# Patient Record
Sex: FEMALE | Race: BLACK OR AFRICAN AMERICAN | NOT HISPANIC OR LATINO | Employment: FULL TIME | URBAN - METROPOLITAN AREA
[De-identification: names, ages, dates, MRNs, and addresses within clinical notes are randomized per-mention and may not be internally consistent; named-entity substitution may affect disease eponyms.]

---

## 2018-05-01 PROBLEM — E66.811 OBESITY (BMI 30.0-34.9): Status: ACTIVE | Noted: 2018-05-01

## 2024-01-08 ENCOUNTER — PATIENT MESSAGE (OUTPATIENT)
Dept: ADMINISTRATIVE | Facility: HOSPITAL | Age: 59
End: 2024-01-08
Payer: COMMERCIAL

## 2024-01-08 ENCOUNTER — PATIENT OUTREACH (OUTPATIENT)
Dept: ADMINISTRATIVE | Facility: HOSPITAL | Age: 59
End: 2024-01-08
Payer: COMMERCIAL

## 2024-01-08 NOTE — PROGRESS NOTES
Population Health Chart Review & Patient Outreach Details     Dr. Dominguez no longer with [facility],need establish care with another provider. If want to follow Dr Dominguez can go to [facility] at [address] [place] [address] [id number], [place] [address] - phone: [phone].  Due for overdue HM (mammogram), need to get recent records if already done. If not done, orders/referrals need to be place and schedule. Please forward messages to me.   Left message for patient to return call.Sent [employer] message.   Further Action Needed If Patient Returns Outreach:            Updates Requested / Reviewed:     [x]  Care Everywhere    []     []  External Sources ([facility], [facility], [facility], etc.)    [] [facility]   [] [facility]   [] Other:    [x]  Care Team Updated   []  Removed  or Duplicate Orders   []  Immunization Reconciliation Completed / Queried    [] [place]   [] [place]   [] [place]   [] [place]      Health Maintenance Topics Addressed and Outreach Outcomes / Actions Taken:             Breast Cancer Screening []  Mammogram Order Placed    []  Mammogram Screening Scheduled    []  External Records Requested & Care Team Updated if Applicable    []  External Records Uploaded & Care Team Updated if Applicable    []  Pt Declined Scheduling Mammogram    []  Pt Will Schedule with External Provider / Order Routed & Care Team Updated if Applicable              Cervical Cancer Screening []  Pap Smear Scheduled in Primary Care or OBGYN    []  External Records Requested & Care Team Updated if Applicable       []  External Records Uploaded, Care Team Updated, & History Updated if Applicable    []  Patient Declined Scheduling Pap Smear    []  Patient Will Schedule with External Provider & Care Team Updated if Applicable                  Colorectal Cancer Screening []  Colonoscopy Case Request / Referral / Home Test Order Placed    []  External Records Requested & Care Team Updated if Applicable    []  External Records  Uploaded, Care Team Updated, & History Updated if Applicable    []  Patient Declined Completing Colon Cancer Screening    []  Patient Will Schedule with External Provider & Care Team Updated if Applicable    []  Fit Kit Mailed (add the SmartPhrase under additional notes)    []  Reminded Patient to Complete Home Test                Diabetic Eye Exam []  Eye Exam Screening Order Placed    []  Eye Camera Scheduled or Optometry/Ophthalmology Referral Placed    []  External Records Requested & Care Team Updated if Applicable    []  External Records Uploaded, Care Team Updated, & History Updated if Applicable    []  Patient Declined Scheduling Eye Exam    []  Patient Will Schedule with External Provider & Care Team Updated if Applicable             Blood Pressure Control []  Primary Care Follow Up Visit Scheduled     []  Remote Blood Pressure Reading Captured    []  Patient Declined Remote Reading or Scheduling Appt - Escalated to PCP    []  Patient Will Call Back or Send Portal Message with Reading                 HbA1c & Other Labs []  Overdue Lab(s) Ordered    []  Overdue Lab(s) Scheduled    []  External Records Uploaded & Care Team Updated if Applicable    []  Primary Care Follow Up Visit Scheduled     []  Reminded Patient to Complete A1c Home Test    []  Patient Declined Scheduling Labs or Will Call Back to Schedule    []  Patient Will Schedule with External Provider / Order Routed, & Care Team Updated if Applicable           Primary Care Appointment []  Primary Care Appt Scheduled    []  Patient Declined Scheduling or Will Call Back to Schedule    []  Pt Established with External Provider, Updated Care Team, & Informed Pt to Notify Payor if Applicable           Medication Adherence /    Statin Use []  Primary Care Appointment Scheduled    []  Patient Reminded to  Prescription    []  Patient Declined, Provider Notified if Needed    []  Sent Provider Message to Review to Evaluate Pt for Statin, Add Exclusion Dx  Codes, Document   Exclusion in Problem List, Change Statin Intensity Level to Moderate or High Intensity if Applicable                Osteoporosis Screening []  Dexa Order Placed    []  Dexa Appointment Scheduled    []  External Records Requested & Care Team Updated    []  External Records Uploaded, Care Team Updated, & History Updated if Applicable    []  Patient Declined Scheduling Dexa or Will Call Back to Schedule    []  Patient Will Schedule with External Provider / Order Routed & Care Team Updated if Applicable       Additional Notes:

## 2024-06-16 DIAGNOSIS — I10 ESSENTIAL HYPERTENSION: ICD-10-CM

## 2024-06-17 RX ORDER — HYDROCHLOROTHIAZIDE 25 MG/1
25 TABLET ORAL
Qty: 90 TABLET | Refills: 0 | Status: SHIPPED | OUTPATIENT
Start: 2024-06-17

## 2024-06-17 NOTE — TELEPHONE ENCOUNTER
Refill Routing Note   Medication(s) are not appropriate for processing by [facility] for the following reason(s):        Non-participating provider    [facility] action(s):  Route               Appointments  past 12m or future 3m with PCP    Date Provider   Last Visit   6/22/2023 Juana Dominguez MD   Next Visit   Visit date not found Juana Dominguez MD   ED visits in past 90 days: 0        Note composed:3:37 AM 06/17/2024

## 2024-06-26 DIAGNOSIS — I10 ESSENTIAL HYPERTENSION: ICD-10-CM

## 2024-06-26 DIAGNOSIS — R73.03 PREDIABETES: ICD-10-CM

## 2024-07-09 ENCOUNTER — PATIENT MESSAGE (OUTPATIENT)
Dept: ADMINISTRATIVE | Facility: HOSPITAL | Age: 59
End: 2024-07-09
Payer: COMMERCIAL

## 2024-07-28 ENCOUNTER — PATIENT MESSAGE (OUTPATIENT)
Dept: FAMILY MEDICINE | Facility: CLINIC | Age: 59
End: 2024-07-28
Payer: COMMERCIAL

## 2024-09-17 DIAGNOSIS — I10 ESSENTIAL HYPERTENSION: ICD-10-CM

## 2024-09-17 RX ORDER — HYDROCHLOROTHIAZIDE 25 MG/1
25 TABLET ORAL
Qty: 90 TABLET | Refills: 0 | Status: SHIPPED | OUTPATIENT
Start: 2024-09-17

## 2024-11-25 ENCOUNTER — OFFICE VISIT (OUTPATIENT)
Dept: FAMILY MEDICINE | Facility: CLINIC | Age: 59
End: 2024-11-25
Payer: COMMERCIAL

## 2024-11-25 VITALS
SYSTOLIC BLOOD PRESSURE: 108 MMHG | DIASTOLIC BLOOD PRESSURE: 76 MMHG | TEMPERATURE: 99 F | HEIGHT: 66 IN | OXYGEN SATURATION: 95 % | WEIGHT: 212.06 LBS | HEART RATE: 73 BPM | BODY MASS INDEX: 34.08 KG/M2

## 2024-11-25 DIAGNOSIS — J30.9 CHRONIC ALLERGIC RHINITIS: ICD-10-CM

## 2024-11-25 DIAGNOSIS — E66.811 CLASS 1 OBESITY: ICD-10-CM

## 2024-11-25 DIAGNOSIS — Z00.00 ROUTINE ADULT HEALTH MAINTENANCE: Primary | ICD-10-CM

## 2024-11-25 DIAGNOSIS — Z82.0 FAMILY HISTORY OF ALZHEIMER'S DISEASE: ICD-10-CM

## 2024-11-25 PROCEDURE — 99396 PREV VISIT EST AGE 40-64: CPT | Mod: S$GLB,,, | Performed by: INTERNAL MEDICINE

## 2024-11-25 PROCEDURE — 99214 OFFICE O/P EST MOD 30 MIN: CPT | Mod: 25,S$GLB,, | Performed by: INTERNAL MEDICINE

## 2024-11-25 PROCEDURE — 99999 PR PBB SHADOW E&M-EST. PATIENT-LVL IV: CPT | Mod: PBBFAC,,, | Performed by: INTERNAL MEDICINE

## 2024-11-25 PROCEDURE — 3044F HG A1C LEVEL LT 7.0%: CPT | Mod: CPTII,S$GLB,, | Performed by: INTERNAL MEDICINE

## 2024-11-25 PROCEDURE — 3078F DIAST BP <80 MM HG: CPT | Mod: CPTII,S$GLB,, | Performed by: INTERNAL MEDICINE

## 2024-11-25 PROCEDURE — 3074F SYST BP LT 130 MM HG: CPT | Mod: CPTII,S$GLB,, | Performed by: INTERNAL MEDICINE

## 2024-11-25 PROCEDURE — 3008F BODY MASS INDEX DOCD: CPT | Mod: CPTII,S$GLB,, | Performed by: INTERNAL MEDICINE

## 2024-11-25 RX ORDER — TIRZEPATIDE 2.5 MG/.5ML
2.5 INJECTION, SOLUTION SUBCUTANEOUS
Qty: 2 ML | Refills: 0 | Status: SHIPPED | OUTPATIENT
Start: 2024-11-25

## 2024-11-25 RX ORDER — AZELASTINE 1 MG/ML
1 SPRAY, METERED NASAL 2 TIMES DAILY
Qty: 30 ML | Refills: 2 | Status: SHIPPED | OUTPATIENT
Start: 2024-11-25 | End: 2025-11-25

## 2024-11-25 NOTE — PATIENT INSTRUCTIONS
"[facility]  Address: [address] [place] [address] [place] [address]  Hours: Open ? Closes 4?PM  Phone: [phone]      HOW TO USE NEILMED SINUS RINSE TO IRRIGATE/CLEAN YOUR SINUSES      Obtain a Neilmed Sinus Rinse kit. You can get the kit from your local pharmacy or Dr. TATTOFF's website. Dr. TATTOFF offers three types of kits:   The Sinus Rinse Starter Kit includes an 8-ounce (240ml) squeeze bottle and 5 packets of premixed rinse solution.   The Sinus Rinse Complete Kit includes an 8-ounce (240 ml) squeeze bottle and 50 packets of premixed rinse solution.   The Sinus Rinse Kids Starter Kit includes a 4-ounce (120ml) squeeze bottle and 30 packets of premixed rinse solution, specially formulated for children.  Wash your hands to avoid contaminating the product. The CDC recommends that you use warm water and soap. Scrub your hands for about 20 seconds, or about the amount of time it takes to sing the "Happy Birthday" song twice.  Warm up distilled or previously boiled water until it is slightly warm. You can warm water up on the stove or in the microwave in a clean safe container. You should warm the water for 5 seconds at a time if using a microwave. It should be at body-temperature, or "lukewarm."   Do not use water that is not micro-filtered, boiled, or distilled to rinse your sinuses. Tap water may contain microorganisms that could cause illness. Fill the bottle with the designated amount of water. The correct amount of water should be 8 oz. (240 ml). Your water line should be at the dotted fill line of the bottle. If you are using a Kids Sinus Rinse kit, you will use 4 oz. (120 ml) of water  Pour the contents into the bottle and tighten the cap. Make sure you screw the cap on tightly so it doesn't fall off in the next step  Place one finger over the tip and shake the bottle gently. This will allow the saline mixture to dissolve into the waterPut the nozzle tip snugly against one of your nostrils. Keep your mouth open, " because the mixture can drain from your mouth as well as the opposite nostril. This also reduces pressure on the earsSqueeze the bottle gently to force the liquid into your nasal passages. Squeeze until the solution begins to drain from the opposite nostrilSqueeze the bottle until 1/4-to-1/2 (60-to-120 ml) is used in one nostril. You can use up to half the solution per nostril, but you should always use at least one-quarter of the solution for each. Blow your nose without pinching it completely shut. Pinching your nose entirely shut would put too much pressure on your eardrums. Then, try sniffing in the remaining solution to help clear out the nasopharyngeal area (at the back of your nasal passages).   Tilt your head to the opposite side to expel any remaining solution from your sinuses or nasal passage.   Spit out any solution that reaches the back of your throat.  Repeat the last 5 steps for the other nostril  Discard the tiny amount of solution left over. Never store leftover solution. It can breed bacteria  Disinfect the sinus rinse bottle. Rinse the cap, tube, and rinse bottle with water. Then, add a drop of dishwashing detergent to the bottle and fill it with water. Put the cap on and shake the bottle well. Squeeze the soapy water through the cap. Use a bottle brush to scrub the bottle, cap, and tube. Rinse thoroughly with clean water. Air dry the bottle and nozzle on a clean towel or glass plate

## 2024-11-26 ENCOUNTER — LAB VISIT (OUTPATIENT)
Dept: LAB | Facility: HOSPITAL | Age: 59
End: 2024-11-26
Attending: INTERNAL MEDICINE
Payer: COMMERCIAL

## 2024-11-26 ENCOUNTER — PATIENT OUTREACH (OUTPATIENT)
Dept: ADMINISTRATIVE | Facility: HOSPITAL | Age: 59
End: 2024-11-26
Payer: COMMERCIAL

## 2024-11-26 DIAGNOSIS — Z00.00 ROUTINE ADULT HEALTH MAINTENANCE: ICD-10-CM

## 2024-11-26 LAB
ALBUMIN SERPL BCP-MCNC: 3.8 G/DL (ref 3.5–5.2)
ALP SERPL-CCNC: 71 U/L (ref 40–150)
ALT SERPL W/O P-5'-P-CCNC: 49 U/L (ref 10–44)
ANION GAP SERPL CALC-SCNC: 10 MMOL/L (ref 8–16)
AST SERPL-CCNC: 33 U/L (ref 10–40)
BASOPHILS # BLD AUTO: 0.07 K/UL (ref 0–0.2)
BASOPHILS NFR BLD: 1.6 % (ref 0–1.9)
BILIRUB SERPL-MCNC: 0.4 MG/DL (ref 0.1–1)
BUN SERPL-MCNC: 14 MG/DL (ref 6–20)
CALCIUM SERPL-MCNC: 9.6 MG/DL (ref 8.7–10.5)
CHLORIDE SERPL-SCNC: 105 MMOL/L (ref 95–110)
CHOLEST SERPL-MCNC: 177 MG/DL (ref 120–199)
CHOLEST/HDLC SERPL: 2.2 {RATIO} (ref 2–5)
CO2 SERPL-SCNC: 29 MMOL/L (ref 23–29)
CREAT SERPL-MCNC: 0.9 MG/DL (ref 0.5–1.4)
DIFFERENTIAL METHOD BLD: ABNORMAL
EOSINOPHIL # BLD AUTO: 0.2 K/UL (ref 0–0.5)
EOSINOPHIL NFR BLD: 4.5 % (ref 0–8)
ERYTHROCYTE [DISTWIDTH] IN BLOOD BY AUTOMATED COUNT: 12.9 % (ref 11.5–14.5)
EST. GFR  (NO RACE VARIABLE): >60 ML/MIN/1.73 M^2
ESTIMATED AVG GLUCOSE: 128 MG/DL (ref 68–131)
GLUCOSE SERPL-MCNC: 98 MG/DL (ref 70–110)
HBA1C MFR BLD: 6.1 % (ref 4–5.6)
HCT VFR BLD AUTO: 43.9 % (ref 37–48.5)
HDLC SERPL-MCNC: 79 MG/DL (ref 40–75)
HDLC SERPL: 44.6 % (ref 20–50)
HGB BLD-MCNC: 14.4 G/DL (ref 12–16)
IMM GRANULOCYTES # BLD AUTO: 0.01 K/UL (ref 0–0.04)
IMM GRANULOCYTES NFR BLD AUTO: 0.2 % (ref 0–0.5)
LDLC SERPL CALC-MCNC: 86.6 MG/DL (ref 63–159)
LYMPHOCYTES # BLD AUTO: 2 K/UL (ref 1–4.8)
LYMPHOCYTES NFR BLD: 44.9 % (ref 18–48)
MCH RBC QN AUTO: 29.9 PG (ref 27–31)
MCHC RBC AUTO-ENTMCNC: 32.8 G/DL (ref 32–36)
MCV RBC AUTO: 91 FL (ref 82–98)
MONOCYTES # BLD AUTO: 0.5 K/UL (ref 0.3–1)
MONOCYTES NFR BLD: 10.6 % (ref 4–15)
NEUTROPHILS # BLD AUTO: 1.7 K/UL (ref 1.8–7.7)
NEUTROPHILS NFR BLD: 38.2 % (ref 38–73)
NONHDLC SERPL-MCNC: 98 MG/DL
NRBC BLD-RTO: 0 /100 WBC
PAP RECOMMENDATION EXT: NORMAL
PAP SMEAR: NORMAL
PLATELET # BLD AUTO: 242 K/UL (ref 150–450)
PMV BLD AUTO: 11.8 FL (ref 9.2–12.9)
POTASSIUM SERPL-SCNC: 3.5 MMOL/L (ref 3.5–5.1)
PROT SERPL-MCNC: 7.1 G/DL (ref 6–8.4)
RBC # BLD AUTO: 4.82 M/UL (ref 4–5.4)
SODIUM SERPL-SCNC: 144 MMOL/L (ref 136–145)
TRIGL SERPL-MCNC: 57 MG/DL (ref 30–150)
WBC # BLD AUTO: 4.43 K/UL (ref 3.9–12.7)

## 2024-11-26 PROCEDURE — 83036 HEMOGLOBIN GLYCOSYLATED A1C: CPT | Performed by: INTERNAL MEDICINE

## 2024-11-26 PROCEDURE — 85025 COMPLETE CBC W/AUTO DIFF WBC: CPT | Performed by: INTERNAL MEDICINE

## 2024-11-26 PROCEDURE — 80061 LIPID PANEL: CPT | Performed by: INTERNAL MEDICINE

## 2024-11-26 PROCEDURE — 80053 COMPREHEN METABOLIC PANEL: CPT | Performed by: INTERNAL MEDICINE

## 2024-11-26 PROCEDURE — 36415 COLL VENOUS BLD VENIPUNCTURE: CPT | Mod: PO | Performed by: INTERNAL MEDICINE

## 2024-11-26 NOTE — PROGRESS NOTES
Subjective:     History of Present Illness    CHIEF COMPLAINT:  - Idalmis presents for an initial visit to [facility] care, with primary concerns of chronic sinus problems, cough, and weight management.    HPI:  Idalmis reports a longstanding history of sinus problems and cough. She has constant postnasal drip, interfering with her ability to speak and necessitating frequent throat clearing and coughing. Various OTC medications provide temporary relief before losing effectiveness. A previously prescribed medication (possibly Aputent or similar) was ineffective and potentially triggered cough attacks.    Idalmis has coughing fits during which she struggles to catch her breath due to persistent cough triggers. These episodes are not accompanied by wheezing or shortness of breath, except immediately following prolonged coughing spells. The cough is non-productive. She was previously diagnosed with cough-variant asthma and reports difficulty breathing during coughing attacks.    Symptoms are exacerbated by environmental factors, including multiple environmental allergies (trees, grass, and animal dander), confirmed by previous allergy testing. Her work environment contains chemical irritants that trigger burning sensations in her eyes, nose, and mouth, further aggravating her symptoms.    Idalmis is concerned about being pre-diabetic, mentioning an A1C of 5.9% from about a year ago. She has a family history of diabetes, with her biological father being a diabetic and double amputee. She also expresses concern about potential cognitive issues, noting a family history of dementia (her mother and maternal grandfather). While she is not currently having significant memory problems, she occasionally forgets things at work and is worried about her future cognitive health.    Idalmis seeks assistance with weight management. She currently walks her dog at least twice weekly but struggles with maintaining a healthy diet due to her  busy schedule and frequent consumption of fast food.    MEDICATIONS:  - Hydrochlorothiazide 25 mg, daily, for blood pressure    MEDICAL HISTORY:  - Obesity: Body mass index is 34.23 kg/m².  - Pre-diabetes: Diagnosed approximately 1 year ago  - Chronic sinus problems/allergies: Since age 33  - Cough-variant asthma: Diagnosed in adulthood    FAMILY HISTORY:  - Father: Diabetes, double amputee due to diabetes complications  - Mother: Kidney disease, heart disease, dementia  - Maternal grandfather: Dementia    TEST RESULTS:  - A1C: July last year, 5.9%, pre-diabetic.  - Allergy skin testing: Date not specified, positive for trees, grass, environmental allergens, animal dander.    ALLERGIES:  - Environmental allergens (trees, grass, environmental allergens, animal dander): nasal congestion, coughing, sinus problems    SOCIAL HISTORY:  - Occupation: Works at a [employer] home - in an environment with chemical exposure for 8 hours a day      ROS:  ENT: +nasal congestion, +post nasal drip  Respiratory: +cough, -shortness of breath, -wheezing  Psychiatric: -memory problems  Allergic: +seasonal allergies         Objective:     Vitals:    24 1317   BP: 108/76   Pulse: 73   Temp: 98.5 °F (36.9 °C)       Physical Exam    Vitals: Blood pressure: 108/76.  Respiratory: Normal respiratory effort. Clear to auscultation bilaterally. No rales. No rhonchi. No wheezing.         Assessment/Plan     1. Routine adult health maintenance  Hemoglobin A1C    Lipid Panel    Comprehensive Metabolic Panel    CBC Auto Differential      2. Class 1 obesity  tirzepatide, weight loss, (ZEPBOUND) 2.5 mg/0.5 mL PnIj      3. Chronic allergic rhinitis  Ambulatory referral/consult to Allergy    azelastine (ASTELIN) 137 mcg (0.1 %) nasal spray      4. Family history of Alzheimer's disease  APOE Alzheimer's Risk    APOE Alzheimer's Risk          Assessment & Plan      Idalmis Leong was seen today for routine physical. Separate E/M Code for acute  conditions discussed during today's routine physical examination have been documented in the assessment and plan below.     Patient has chronic sinus problems with nasal congestion and cough, likely related to environmental allergies   Considering immunotherapy options like sublingual tablets (e.g. Oralair, Grastek) as alternative to allergy shots if qualifies   Occupational exposure to chemicals may be exacerbating respiratory symptoms   Prediabetic status needs reassessment with updated lab work   Obesity management needed to reduce health risks    PLAN SUMMARY:  Ordered fasting labs: lipid panel, A1C, CBC, CMP  Started azelastine nasal spray and Neilmed Sinus Rinse  Ordered ApoE genetic testing for Alzheimer's risk assessment  Follow up in 1 month to discuss lab results and medication effectiveness  Referred to allergy specialist for sublingual immunotherapy evaluation  Started Zetbound for weight management, starting at 2.5 mg dose  Vandra to continue walking dog 2 times weekly for exercise  Recommend improving diet and reducing fast food consumption    ALLERGIC RHINITIS AND SINUS DISORDERS:  Started azelastine nasal spray, use 1 time daily.   Started Neilmed Sinus Rinse, use 1 time daily alternating with azelastine.  Referred to allergy specialist to evaluate for sublingual immunotherapy.    OBESITY AND WEIGHT MANAGEMENT:  Body mass index is 34.23 kg/m².  Explained that stress can negatively impact metabolism and contribute to weight gain.  Recommend improving diet and reducing fast food consumption.  Trial Zepbound for weight management, begin at 2.5 mg dose and titrate up monthly as tolerated.    CARDIOVASCULAR AND METABOLIC HEALTH SCREENING:  Fasting labs including lipid panel, A1C, CBC, CMP ordered.    ALZHEIMER'S DISEASE RISK ASSESSMENT:  Discussed that exercise, particularly aerobic activity, may help reduce risk of developing Alzheimer's disease.  ApoE genetic testing ordered for Alzheimer's risk  assessment per patient request/preference - to be performed at [facility]    EXERCISE AND PHYSICAL ACTIVITY:  Vandra to continue walking dog at least 2 times weekly for exercise.    FOLLOW-UP AND CARE COORDINATION:  Follow up in about 1 month to discuss lab results and medication effectiveness.  Contact the office via email after labs are completed.         This note was generated with the assistance of ambient listening technology. Verbal consent was obtained by the patient and accompanying visitor(s) for the recording of patient appointment to facilitate this note. I attest to having reviewed and edited the generated note for accuracy, though some syntax or spelling errors may persist. Please contact the author of this note for any clarification.      Efren Tatum MD  Internal Medicine-Pediatrics

## 2024-12-10 ENCOUNTER — OFFICE VISIT (OUTPATIENT)
Dept: ALLERGY | Facility: CLINIC | Age: 59
End: 2024-12-10
Payer: COMMERCIAL

## 2024-12-10 VITALS — BODY MASS INDEX: 34.26 KG/M2 | HEIGHT: 66 IN | WEIGHT: 213.19 LBS

## 2024-12-10 DIAGNOSIS — J30.9 CHRONIC ALLERGIC RHINITIS: ICD-10-CM

## 2024-12-10 DIAGNOSIS — R05.3 CHRONIC COUGH: Primary | ICD-10-CM

## 2024-12-10 PROCEDURE — 3008F BODY MASS INDEX DOCD: CPT | Mod: CPTII,S$GLB,, | Performed by: STUDENT IN AN ORGANIZED HEALTH CARE EDUCATION/TRAINING PROGRAM

## 2024-12-10 PROCEDURE — 1159F MED LIST DOCD IN RCRD: CPT | Mod: CPTII,S$GLB,, | Performed by: STUDENT IN AN ORGANIZED HEALTH CARE EDUCATION/TRAINING PROGRAM

## 2024-12-10 PROCEDURE — 99205 OFFICE O/P NEW HI 60 MIN: CPT | Mod: S$GLB,,, | Performed by: STUDENT IN AN ORGANIZED HEALTH CARE EDUCATION/TRAINING PROGRAM

## 2024-12-10 PROCEDURE — 3044F HG A1C LEVEL LT 7.0%: CPT | Mod: CPTII,S$GLB,, | Performed by: STUDENT IN AN ORGANIZED HEALTH CARE EDUCATION/TRAINING PROGRAM

## 2024-12-10 PROCEDURE — 99999 PR PBB SHADOW E&M-EST. PATIENT-LVL III: CPT | Mod: PBBFAC,,, | Performed by: STUDENT IN AN ORGANIZED HEALTH CARE EDUCATION/TRAINING PROGRAM

## 2024-12-10 RX ORDER — HYDROCODONE BITARTRATE AND ACETAMINOPHEN 5; 325 MG/1; MG/1
TABLET ORAL
COMMUNITY
Start: 2024-07-22

## 2024-12-10 NOTE — PROGRESS NOTES
"ALLERGY & IMMUNOLOGY CLINIC   HISTORY OF PRESENT ILLNESS   Referral from: Aaareferral Self  CC:   Chief Complaint   Patient presents with    Allergies       HPI: Idalmis Leong is a 59 y.o. female  History obtained from patient  Chronic cough for years since around  was told it was asthma cough  Used to only come in January and thought it was due to weather but now every day  Here to figure out what root cause of cough is  Cough is all day long, sometimes happens when around smells, or when she is talking  Sometimes happens in her sleep, sometimes wakes her up  No urinary or bowel incontinence  But sometimes has to grab her side because of how intense it is  Does not throw out back from this  No days without coughing  CXR  and  normal  CT sinus  normal  No issues with exertion  No ACEi  Allergies in the spring  No GERD  When lays flat no SOB  PFT  normal, no post BD    Drug Allergies: Review of patient's allergies indicates:  No Known Allergies      MEDICAL HISTORY   SurgHx:  Past Surgical History:   Procedure Laterality Date     SECTION      COLONOSCOPY N/A 12/15/2021    Procedure: COLONOSCOPY;  Surgeon: Caleb Virk MD;  Location: [address]);  Service: Endoscopy;  Laterality: N/A;  fully vaccinated 21, prep instr emailed -ml    REFRACTIVE SURGERY Bilateral         PHYSICAL EXAM   VS: Ht 5' 6" (1.676 m)   Wt 96.7 kg (213 lb 3 oz)   LMP 2017 (LMP Unknown)   BMI 34.41 kg/m²   GENERAL: NAD, well nourished, well appearing  EYES: no conjunctival injection, no discharge, no infraorbital shiners  EARS: external auditory canals normal B/L  ORAL: MMM, torus palatinus, Mallampati 4+  LUNGS: CTAB, no w/r/c, no increased WOB  DERM: no rashes     ASSESSMENT & PLAN     Neurosensory cough: suspected initially due to mold exposure that resulted in throat clearing, mucus production, cough, and continued loop of this  Often after a URI (like Covid) and is due to " injury or inflammation to the superior or recurrent laryngeal nerve leading to altered sensation. And it is a dry, nonproductive, irritating cough through the day and then almost always stops during sleep. Common triggers: strong odors, perfumes, cigarette smoke, temperature changes, laughing, talking.   - Reviewed re-referring to pulm, allergy testing, PFT, possibly imaging, however after reviewing the many possible causes patient thinks this may be neurosensory/laryngeal cough and would like try lozenges, water, humming, hard swallow, snapping a rubber band on your wrist. If not improved she will consider speech therapy.     You may benefit from a speech therapist who does cough retraining therapy aka behavioral cough suppression therapy.  There are 4 components: education, cough suppression, reduction of laryngeal irritation, and psychoeducational counseling.   On the education side it is important to understand that cough perpetuates the sensitivity and that cough can be suppressed.   The suppression strategies are to interrupt the cough and that can mean lozenges, water, humming, hard swallow, snapping a rubber band on your wrist, but the key is to identify the sensation and substitute the cough response for something else so that it almost becomes muscles memory. It is very effective for many many patients.   Reduce laryngeal irritation: hydration, voice rest.   Cough therapy is effective in trials, recommended by CHEST, and also useful in combination with medical treatment to hasten resolution. If we can chip away at 15-20% even in a multifactorial cough, it will help as every bit that you can get rid of might be getting rid of longer episodes that are triggered by the initial coughing.     Treatment options for neurogenic cough: blocking the reflex arc  TCAs amitriptyline (10-50mg QHS) and nortriptyline (75mg QHS). You can start with amitriptyline 10mg every day for a week, 20mg for the next week, and keep  building up to 50mg daily. Usually you go until they have side effects. Some people cant tolerate the grogginess and sedation.  Some people have success with an SSRI (sertraline, fluoxetine, (es)citalopram  There are 2 first generation over the counter antihistamines brompheniramine/dimetapp and chlorpheniramine/chlortrimeton of which the 12mg daily extended tablet works best, which were the backbone in development of SSRIs can work by decreasing surveillance of sensation - the adverse effect can be sedation.  Gapa-B agonists gabapentin/neurontin (and pregabalin/lyrica 70mg 2-3x/day): Gabapentin 300mg once a day, then BID, then TID, until symptoms are gone for 2-3 months, then reduce by 300mg. Side effects of this are sedation, dizziness, and GI (nausea/diarrhea). Pregabalin works the same way as gabapentin.   Tramadol (for patients with syncope) for 3-4 weeks  Tessalon perles: numbing agent, has mixed results  Dextromethorphan (NMDA/glutamate antagonist)    Follow up: PRN    I spent a total of 60 minutes on the day of the visit. This includes face to face time and non-face to face time preparing to see the patient (eg, review of tests), obtaining and/or reviewing separately obtained history, documenting clinical information in the electronic or other health record, independently interpreting results and communicating results to the patient/family/caregiver, or care coordinator.

## 2024-12-16 DIAGNOSIS — I10 ESSENTIAL HYPERTENSION: ICD-10-CM

## 2024-12-16 RX ORDER — HYDROCHLOROTHIAZIDE 25 MG/1
25 TABLET ORAL DAILY
Qty: 90 TABLET | Refills: 0 | Status: SHIPPED | OUTPATIENT
Start: 2024-12-16

## 2024-12-16 NOTE — TELEPHONE ENCOUNTER
----- Message from [email or web address] sent at 12/16/2024 11:07 AM CST -----  Who Called:self      Refill or New Rx:refill      RX Name and Strength:hydroCHLOROthiazide (HYDRODIURIL) 25 MG tablet      Is this a 30 day or 90 day RX:      Preferred Pharmacy with phone number: - [facility] #[id number] - [place] [address]      Would the patient rather a call back or a response via [facility]?call      Best Call Back Number:.[phone]    Additional Information: pt wants know the status of the weight loss that the doctor was also going to prescribe pt can't remember the name pt states she only has 3 pills left of the   hydroCHLOROthiazide (HYDRODIURIL) 25 MG tablet

## 2024-12-16 NOTE — TELEPHONE ENCOUNTER
Care Due:                  Date            Visit Type   Department     Provider  --------------------------------------------------------------------------------                                [facility]                              PRIMARY      MED/ INTERNAL  Last Visit: 11-      CARE ([facility])   MED/ PEDS      Efren Tatum                              ESTABLISHED   [facility]                              PATIENT -    MED/ INTERNAL  Next Visit: 01-      VIRTUAL      MED/ PEDS      Efren Tatum                                                            Last  Test          Frequency    Reason                     Performed    Due Date  --------------------------------------------------------------------------------    Vitamin D...  12 months..  ergocalciferol...........  03- 03-    [facility] Embedded Care Due Messages. Reference number: [id number].   12/16/2024 11:28:21 AM CST